# Patient Record
Sex: FEMALE | Race: WHITE | NOT HISPANIC OR LATINO | Employment: FULL TIME | URBAN - METROPOLITAN AREA
[De-identification: names, ages, dates, MRNs, and addresses within clinical notes are randomized per-mention and may not be internally consistent; named-entity substitution may affect disease eponyms.]

---

## 2021-12-13 ENCOUNTER — VIRTUAL VISIT (OUTPATIENT)
Dept: DERMATOLOGY | Facility: CLINIC | Age: 36
End: 2021-12-13
Payer: COMMERCIAL

## 2021-12-13 DIAGNOSIS — L63.9 ALOPECIA AREATA: Primary | ICD-10-CM

## 2021-12-13 PROCEDURE — 99203 OFFICE O/P NEW LOW 30 MIN: CPT | Mod: 95 | Performed by: DERMATOLOGY

## 2021-12-13 RX ORDER — BETAMETHASONE DIPROPIONATE 0.5 MG/G
OINTMENT, AUGMENTED TOPICAL 2 TIMES DAILY
Qty: 50 G | Refills: 3 | Status: SHIPPED | OUTPATIENT
Start: 2021-12-13

## 2021-12-13 ASSESSMENT — PAIN SCALES - GENERAL: PAINLEVEL: NO PAIN (0)

## 2021-12-13 NOTE — LETTER
Date:December 15, 2021      Patient was self referred, no letter generated. Do not send.        [facility] Health Information

## 2021-12-13 NOTE — NURSING NOTE
Chief Complaint   Patient presents with     Hair Loss     Howie, is here for a hairloss appt.    Yefri Tang EMT

## 2021-12-13 NOTE — LETTER
12/13/2021       RE: Howie Green  [address] [place] [address]     Dear Colleague,    Thank you for referring your patient, Howie Green, to the [facility] DERMATOLOGY CLINIC [place] at [facility]. Please see a copy of my visit note below.    [facility] Dermatology Note  Encounter Date: Dec 13, 2021  MyChart Connected.    Dermatology Problem List:  1. Alopecia areata: frontal hairline  - augmented betamethasone ointment; in-person intralesional triamcinolone injections pending    ____________________________________________    Assessment & Plan:     1. Alopecia areata: typical clinical appearance and history. Has personal history of Graves but currently off medication and well-controlled. We discussed what is known about the pathophysiology and natural history of alopecia areata, including its unpredictable nature. Will start with topicals and come in for intralesional injections. If refractory, consider uptitration to systemic therapies.  - augmented betamethasone ointment  - come in for intralesional triamcinolone injections    Procedures Performed:    None    Follow-up: 2-3 weeks in-person    Staff:     Fili Ramirez MD, FAAD   of Dermatology  Department of Dermatology  [facility] [employer]    ____________________________________________    CC: Hair Loss (Howie, is here for a hairloss appt. )    HPI:  Ms. Howie Green is a(n) 36 year old female who presents today as a new patient for hair loss    Hair loss - noticed thinning in June  - now bald spot on scalp  - no change in size for last few months  - some fuzz has regrowth - but gray  - no scalp symptoms, no other body hair loss, no increased stressors  - has Graves hyperthyroidism    Patient is otherwise feeling well, without additional skin concerns.    Labs Reviewed:  Care Everywhere 8/2021 TSH  unremarkable    Physical Exam:  Vitals: There were no vitals taken for this visit.  SKIN: video images were reviewed; image quality and interpretability: acceptable. Image date: n/a.  - sharply circumscribed patch of hair loss along the frontal hairline  - No other lesions of concern on areas examined.     Medications:  No current outpatient medications on file.     No current facility-administered medications for this visit.      Past Medical/Surgical History:   There is no problem list on file for this patient.    History reviewed. No pertinent past medical history.    CC Referred MD Andres  No address on file on close of this encounter.      Again, thank you for allowing me to participate in the care of your patient.      Sincerely,    Fili Ramirez MD

## 2021-12-13 NOTE — PROGRESS NOTES
[facility] Dermatology Note  Encounter Date: Dec 13, 2021  MyChart Connected.    Dermatology Problem List:  1. Alopecia areata: frontal hairline  - augmented betamethasone ointment; in-person intralesional triamcinolone injections pending    ____________________________________________    Assessment & Plan:     1. Alopecia areata: typical clinical appearance and history. Has personal history of Graves but currently off medication and well-controlled. We discussed what is known about the pathophysiology and natural history of alopecia areata, including its unpredictable nature. Will start with topicals and come in for intralesional injections. If refractory, consider uptitration to systemic therapies.  - augmented betamethasone ointment  - come in for intralesional triamcinolone injections    Procedures Performed:    None    Follow-up: 2-3 weeks in-person    Staff:     Fili Ramirez MD, FAAD   of Dermatology  Department of Dermatology  [facility] [employer] [facility]    ____________________________________________    CC: Hair Loss (Howie, is here for a hairloss appt. )    HPI:  Ms. Howie Green is a(n) 36 year old female who presents today as a new patient for hair loss    Hair loss - noticed thinning in June  - now bald spot on scalp  - no change in size for last few months  - some fuzz has regrowth - but gray  - no scalp symptoms, no other body hair loss, no increased stressors  - has Graves hyperthyroidism    Patient is otherwise feeling well, without additional skin concerns.    Labs Reviewed:  Care Everywhere 8/2021 TSH unremarkable    Physical Exam:  Vitals: There were no vitals taken for this visit.  SKIN: video images were reviewed; image quality and interpretability: acceptable. Image date: n/a.  - sharply circumscribed patch of hair loss along the frontal hairline  - No other lesions of concern on areas examined.     Medications:  No current  outpatient medications on file.     No current facility-administered medications for this visit.      Past Medical/Surgical History:   There is no problem list on file for this patient.    History reviewed. No pertinent past medical history.    CC Referred Self, MD  No address on file on close of this encounter.

## 2022-01-11 ENCOUNTER — OFFICE VISIT (OUTPATIENT)
Dept: DERMATOLOGY | Facility: CLINIC | Age: 37
End: 2022-01-11
Payer: COMMERCIAL

## 2022-01-11 DIAGNOSIS — L63.9 ALOPECIA AREATA: Primary | ICD-10-CM

## 2022-01-11 PROCEDURE — 11900 INJECT SKIN LESIONS </W 7: CPT | Performed by: DERMATOLOGY

## 2022-01-11 ASSESSMENT — PAIN SCALES - GENERAL: PAINLEVEL: NO PAIN (0)

## 2022-01-11 NOTE — PROGRESS NOTES
Drug Administration Record    Prior to injection, verified patient identity using patient's name and date of birth.  Due to injection administration, patient instructed to remain in clinic for 15 minutes  afterwards, and to report any adverse reaction to me immediately.    Drug Name: triamcinolone acetonide(kenalog)  Dose: 1.7 mL of triamcinolone 5mg/mL, 8.5mg dose  Route administered: ID  NDC #: Kenalog-10 (2502-0813-16)  Amount of waste(mL): 4.15  Reason for waste: Single use vial    LOT #: [id number]  SITE: See Note  : [employer]  EXPIRATION DATE: APR 2023

## 2022-01-11 NOTE — PROGRESS NOTES
[facility] Dermatology Note  Encounter Date: Jan 11, 2022  Office Visit     Dermatology Problem List:  1. Alopecia areata: frontal hairline  - prior tx: augmented betamethasone ointment (patient stopped due to irritation)  - s/p ILK 1/11/21  ____________________________________________    Assessment & Plan:    1. Alopecia areata: typical clinical appearance and history. Has personal history of Graves but currently off medication and well-controlled.  - Photodocumentation  - ILK injection today, see procedure note below  - Recommended that patient discontinue topicals as it was likely topicals would be unable to penetrate deep enough  - Recommended that patient take a daily OTC antihistamine such as allegra to help boost the effectiveness of ILK injections  - Recommended sun protection and 30+ SPF sunscreen to the affected spot    Procedures Performed:   - Intra-lesional triamcinolone procedure note. After positioning and cleansing with isopropyl alcohol, 1.7 total mL of triamcinolone 5 mg/mL was injected into 1 lesion(s) on the central frontal scalp. The patient tolerated the procedure well and left the dermatology clinic in good condition.    Follow-up: 1 month(s) in-person, or earlier for new or changing lesions    Staff, Resident and Scribe:     Tabitha Lomas DO   [facility] Resident   Pager#[phone]    Precepted with Dr. Radha Escalanteibtia Disclosure:  I, Fili Borrero, am serving as a scribe to document services personally performed by Basilio Garcia MD based on data collection and the provider's statements to me.     Provider Disclosure:   The documentation recorded by the scribe accurately reflects the services I personally performed and the decisions made by me.    Basilio Garcia MD    Department of Dermatology  [facility]: Phone: [phone], Fax:[phone]  [facility]  [facility]: Phone: [phone] Fax: [phone]    ____________________________________________    CC: Hair Loss (follow up on hair loss.  ILK injections)    HPI:  Ms. Howie Green is a(n) 36 year old female who presents today as a return patient for ILK injections. Patient was last seen by Dr. Ramirez on 12/13/21 for alopecia areata. Per her last visit, patient was started on augmented betamethasone ointment and recommended to come in for ILK injections.    Patient reports that she first began noticing her hair shedding and thinning in June. She notes that the onset was very sudden and that since then the affected spot on her scalp has not gotten any larger. Patient reports that she tried the augmented betamethasone ointment for a bit, however reports that her scalp became irritated and after consulting a pharmacist she stopped taking the ointment. Patient does report a slight increase in gray hairs in central frontal scalp which was not present prior to starting her treatment. Denies any eyebrow/eyelish changes. Denies any other spots of hair loss. No history of seasonal allergies.    Patient is otherwise feeling well, without additional skin concerns.    Labs Reviewed:  N/A    Physical Exam:  Vitals: There were no vitals taken for this visit.  SKIN: Focused examination of scalp was performed.  - Hair pull test is positive of periphery.  - Somewhat thin white eyebrows but normal eyelashes and body hair.  - No significant scalp erythema or scale.  - Central frontal scalp, 4.5 cm by 5.2 cm patchy area of alopecia with some central white fine hair regrowth.  - No other lesions of concern on areas examined.     Medications:  Current Outpatient Medications   Medication     augmented betamethasone dipropionate (DIPROLENE-AF) 0.05 % external ointment     No current facility-administered medications for this visit.      Past Medical History:   There is no problem list on file for this  patient.    No past medical history on file.

## 2022-01-11 NOTE — LETTER
Date:January 12, 2022      Patient was self referred, no letter generated. Do not send.        [facility] Information

## 2022-01-11 NOTE — LETTER
1/11/2022       RE: Howie Green  [address] [place] [address]     Dear Colleague,    Thank you for referring your patient, Howie Green, to the [facility] DERMATOLOGY CLINIC [place] at [facility]. Please see a copy of my visit note below.    [facility] Dermatology Note  Encounter Date: Jan 11, 2022  Office Visit     Dermatology Problem List:  1. Alopecia areata: frontal hairline  - prior tx: augmented betamethasone ointment (patient stopped due to irritation)  - s/p ILK 1/11/21  ____________________________________________    Assessment & Plan:    1. Alopecia areata: typical clinical appearance and history. Has personal history of Graves but currently off medication and well-controlled.  - Photodocumentation  - ILK injection today, see procedure note below  - Recommended that patient discontinue topicals as it was likely topicals would be unable to penetrate deep enough  - Recommended that patient take a daily OTC antihistamine such as allegra to help boost the effectiveness of ILK injections  - Recommended sun protection and 30+ SPF sunscreen to the affected spot    Procedures Performed:   - Intra-lesional triamcinolone procedure note. After positioning and cleansing with isopropyl alcohol, 1.7 total mL of triamcinolone 5 mg/mL was injected into 1 lesion(s) on the central frontal scalp. The patient tolerated the procedure well and left the dermatology clinic in good condition.    Follow-up: 1 month(s) in-person, or earlier for new or changing lesions    Staff, Resident and Scribe:     Tabitha Lomas DO   [facility] Resident   Pager#[phone]    Precepted with Dr. Garcia     Scribe Disclosure:  I, Fili Borrero, am serving as a scribe to document services personally performed by Basilio Garcia MD based on data collection and the provider's statements to me.     Provider Disclosure:   The documentation  recorded by the scribe accurately reflects the services I personally performed and the decisions made by me.    Basilio Garcia MD    Department of Dermatology  [facility]: Phone: [phone], Fax:[phone]  [facility]: Phone: [phone] Fax: [phone]    ____________________________________________    CC: Hair Loss (follow up on hair loss.  ILK injections)    HPI:  Ms. Howie Green is a(n) 36 year old female who presents today as a return patient for ILK injections. Patient was last seen by Dr. aRmirez on 12/13/21 for alopecia areata. Per her last visit, patient was started on augmented betamethasone ointment and recommended to come in for ILK injections.    Patient reports that she first began noticing her hair shedding and thinning in June. She notes that the onset was very sudden and that since then the affected spot on her scalp has not gotten any larger. Patient reports that she tried the augmented betamethasone ointment for a bit, however reports that her scalp became irritated and after consulting a pharmacist she stopped taking the ointment. Patient does report a slight increase in gray hairs in central frontal scalp which was not present prior to starting her treatment. Denies any eyebrow/eyelish changes. Denies any other spots of hair loss. No history of seasonal allergies.    Patient is otherwise feeling well, without additional skin concerns.    Labs Reviewed:  N/A    Physical Exam:  Vitals: There were no vitals taken for this visit.  SKIN: Focused examination of scalp was performed.  - Hair pull test is positive of periphery.  - Somewhat thin white eyebrows but normal eyelashes and body hair.  - No significant scalp erythema or scale.  - Central frontal scalp, 4.5 cm by 5.2 cm patchy area of alopecia with some central white fine hair regrowth.  - No other lesions of  concern on areas examined.     Medications:  Current Outpatient Medications   Medication     augmented betamethasone dipropionate (DIPROLENE-AF) 0.05 % external ointment     No current facility-administered medications for this visit.      Past Medical History:   There is no problem list on file for this patient.    No past medical history on file.       Drug Administration Record    Prior to injection, verified patient identity using patient's name and date of birth.  Due to injection administration, patient instructed to remain in clinic for 15 minutes  afterwards, and to report any adverse reaction to me immediately.    Drug Name: triamcinolone acetonide(kenalog)  Dose: 1.7 mL of triamcinolone 5mg/mL, 8.5mg dose  Route administered: ID  NDC #: Kenalog-10 (1353-2686-04)  Amount of waste(mL): 4.15  Reason for waste: Single use vial    LOT #: [id number]  SITE: See Note  : [employer]  EXPIRATION DATE: APR 2023      Again, thank you for allowing me to participate in the care of your patient.      Sincerely,    Basilio Garcia MD

## 2022-02-03 ENCOUNTER — OFFICE VISIT (OUTPATIENT)
Dept: DERMATOLOGY | Facility: CLINIC | Age: 37
End: 2022-02-03
Payer: COMMERCIAL

## 2022-02-03 DIAGNOSIS — L63.9 ALOPECIA AREATA: Primary | ICD-10-CM

## 2022-02-03 PROCEDURE — 11900 INJECT SKIN LESIONS </W 7: CPT | Performed by: DERMATOLOGY

## 2022-02-03 RX ORDER — FEXOFENADINE HCL 180 MG/1
180 TABLET ORAL DAILY
Qty: 30 TABLET | Refills: 11 | Status: SHIPPED | OUTPATIENT
Start: 2022-02-03 | End: 2022-03-31

## 2022-02-03 ASSESSMENT — PAIN SCALES - GENERAL: PAINLEVEL: NO PAIN (0)

## 2022-02-03 NOTE — NURSING NOTE
Drug Administration Record    Prior to injection, verified patient identity using patient's name and date of birth.  Due to injection administration, patient instructed to remain in clinic for 15 minutes  afterwards, and to report any adverse reaction to me immediately.    Drug Name: triamcinolone acetonide(kenalog)  Dose: .5mL of triamcinolone 5mg/mL, 1mg dose  Route administered: ID  NDC #: Kenalog-10 (6904-2095-63)  Amount of waste(mL):4 ml  Reason for waste: Single use vial or Multi dose vial    LOT #: [id number]  SITE: see providers notes    EXPIRATION DATE: apr,2023

## 2022-02-03 NOTE — LETTER
Date:February 4, 2022      Patient was self referred, no letter generated. Do not send.        [facility] Health Information

## 2022-02-03 NOTE — PATIENT INSTRUCTIONS
1. Alopecia areata  - Photodocumentation  - ILK injection today   - Recommended that patient take a daily OTC antihistamine such as allegra to help boost the effectiveness of ILK injections  - Recommend topical Rogaine foam if tolerated to scalp once a day  - Recommend sun protection and 30+ SPF sunscreen to the affected spot

## 2022-02-03 NOTE — LETTER
2/3/2022       RE: Howie Green  [address] [facility] [address]     Dear Colleague,    Thank you for referring your patient, Howie Green, to the [facility] DERMATOLOGY CLINIC [place] at [facility]. Please see a copy of my visit note below.    [facility] Dermatology Note  Encounter Date: Feb 3, 2022  Office Visit     Dermatology Problem List:  1. Alopecia areata: frontal hairline  - prior tx: augmented betamethasone ointment (patient stopped due to irritation)  - s/p ILK 1/11/22, 2/3/22  ____________________________________________    Assessment & Plan:    1. Alopecia areata: typical clinical appearance and history. Has personal history of Graves but currently off medication and well-controlled.  - Photodocumentation  - ILK injection today, see procedure note below  - Recommended that patient discontinue topicals as it was likely topicals would be unable to penetrate deep enough  - Recommended that patient take a daily OTC antihistamine such as allegra to help boost the effectiveness of ILK injections. Provided prescription of Allegra 180mg daily today.  - Recommended sun protection and 30+ SPF sunscreen to the affected spot    Procedures Performed:   - Intra-lesional triamcinolone procedure note. After positioning and cleansing with isopropyl alcohol, 1.2 total mL of triamcinolone 5 mg/mL was injected into affected area on the anterior scalp. The patient tolerated the procedure well and left the dermatology clinic in good condition.    Follow-up: 6 week(s) in-person, or earlier for new or changing lesions    Staff and Scribe:     Scribe Disclosure:  Svetlana KELLY, am serving as a scribe to document services personally performed by aBsilio Garcia MD based on data collection and the provider's statements to me.     Koki KELLY, examined and staffed the patient with Dr. Radha M.D.    Koki Interiano MS4    Provider  Disclosure:   The documentation recorded by the scribe accurately reflects the services I personally performed and the decisions made by me.    Basilio Garcia MD    Department of Dermatology  [facility]: Phone: [phone], Fax:[phone]  [facility]: Phone: [phone] Fax: [phone]  ____________________________________________    CC: Hair Loss (pt states she is here for follow up on hair loss and ILK, sx are a little better. )    HPI:  Ms. Howie Green is a(n) 36 year old female who presents today as a return patient for alopecia. Last seen in dermatology by myself on 1/11/22 at which point she received ILK injections for treatment of alopecia areata. She was also encouraged to take an OTC anti-histamine.     Here for a second round of ILK, since then she has been doing well. States new hairs are sprouting out over the past week.     Patient is otherwise feeling well, without additional skin concerns.    Labs Reviewed:  N/A    Physical Exam:  Vitals: There were no vitals taken for this visit.  SKIN: Focused examination of scalp, eyebrows was performed.  - negative hair pull test  - scalp without erythema or scale  - central frontal scalp with 4.5 cm X 5.2 cm patchy area of alopecia with some central white fine hair regrowth. Two small areas of skin depression without overlying deformity in this area.  - thin eyebrows but normal eyelashes.  - No other lesions of concern on areas examined.     Medications:  Current Outpatient Medications   Medication     augmented betamethasone dipropionate (DIPROLENE-AF) 0.05 % external ointment     Current Facility-Administered Medications   Medication     triamcinolone acetonide (KENALOG-10) injection 8 mg      Past Medical History:   There is no problem list on file for this patient.    No past medical history on file.         Again, thank you  for allowing me to participate in the care of your patient.      Sincerely,    Basilio Garcia MD

## 2022-02-03 NOTE — PROGRESS NOTES
[facility] Dermatology Note  Encounter Date: Feb 3, 2022  Office Visit     Dermatology Problem List:  1. Alopecia areata: frontal hairline  - prior tx: augmented betamethasone ointment (patient stopped due to irritation)  - s/p ILK 1/11/22, 2/3/22  ____________________________________________    Assessment & Plan:    1. Alopecia areata: typical clinical appearance and history. Has personal history of Graves but currently off medication and well-controlled.  - Photodocumentation  - ILK injection today, see procedure note below  - Recommended that patient discontinue topicals as it was likely topicals would be unable to penetrate deep enough  - Recommended that patient take a daily OTC antihistamine such as allegra to help boost the effectiveness of ILK injections. Provided prescription of Allegra 180mg daily today.  - Recommended sun protection and 30+ SPF sunscreen to the affected spot    Procedures Performed:   - Intra-lesional triamcinolone procedure note. After positioning and cleansing with isopropyl alcohol, 1.2 total mL of triamcinolone 5 mg/mL was injected into affected area on the anterior scalp. The patient tolerated the procedure well and left the dermatology clinic in good condition.    Follow-up: 6 week(s) in-person, or earlier for new or changing lesions    Staff and Scribe:     Scribe Disclosure:  Svetlana KELLY, am serving as a scribe to document services personally performed by Basilio Garcia MD based on data collection and the provider's statements to me.     Koki KELLY, examined and staffed the patient with TERRI Weston MS4    Provider Disclosure:   The documentation recorded by the scribe accurately reflects the services I personally performed and the decisions made by me.    Basilio Garcia MD    Department of Dermatology  [facility]: Phone: [phone],  Fax:[phone]  [facility]: Phone: [phone] Fax: [phone]  ____________________________________________    CC: Hair Loss (pt states she is here for follow up on hair loss and ILK, sx are a little better. )    HPI:  Ms. Howie Green is a(n) 36 year old female who presents today as a return patient for alopecia. Last seen in dermatology by myself on 1/11/22 at which point she received ILK injections for treatment of alopecia areata. She was also encouraged to take an OTC anti-histamine.     Here for a second round of ILK, since then she has been doing well. States new hairs are sprouting out over the past week.     Patient is otherwise feeling well, without additional skin concerns.    Labs Reviewed:  N/A    Physical Exam:  Vitals: There were no vitals taken for this visit.  SKIN: Focused examination of scalp, eyebrows was performed.  - negative hair pull test  - scalp without erythema or scale  - central frontal scalp with 4.5 cm X 5.2 cm patchy area of alopecia with some central white fine hair regrowth. Two small areas of skin depression without overlying deformity in this area.  - thin eyebrows but normal eyelashes.  - No other lesions of concern on areas examined.     Medications:  Current Outpatient Medications   Medication     augmented betamethasone dipropionate (DIPROLENE-AF) 0.05 % external ointment     Current Facility-Administered Medications   Medication     triamcinolone acetonide (KENALOG-10) injection 8 mg      Past Medical History:   There is no problem list on file for this patient.    No past medical history on file.

## 2022-02-06 ENCOUNTER — HEALTH MAINTENANCE LETTER (OUTPATIENT)
Age: 37
End: 2022-02-06

## 2022-03-30 NOTE — PROGRESS NOTES
[facility] Dermatology Note  Encounter Date: Mar 31, 2022  Office Visit     Dermatology Problem List:  1. Alopecia areata: frontal hairline  - prior tx: augmented betamethasone ointment (patient stopped due to irritation)  - s/p ILK 1/11/22, 2/3/22, 3/31/2022   ____________________________________________    Assessment & Plan:    # Alopecia areata: typical clinical appearance and history. Has personal history of Graves but currently off medication and well-controlled. Improving.  - Photodocumentation today  - ILK injection today, see procedure note below  - Recommended that patient take a daily OTC antihistamine such as allegra to help boost the effectiveness of ILK injections. Provided prescription of Allegra 180mg daily today.  - Recommended starting topical minoxidil 5% daily    Procedures Performed:   - Intra-lesional triamcinolone procedure note. After positioning and cleansing with isopropyl alcohol, 1.6 total mL of triamcinolone 5 mg/mL was injected into 16 site(s) on the scalp. The patient tolerated the procedure well and left the dermatology clinic in good condition.    Follow-up: 6-8 week(s) in-person, or earlier for new or changing lesions    Staff and Scribe:     Scribe Disclosure:  I, Demario Rodriguez, am serving as a scribe to document services personally performed by Basilio Garcia MD based on data collection and the provider's statements to me.     Provider Disclosure:   The documentation recorded by the scribe accurately reflects the services I personally performed and the decisions made by me.    Basilio Garcia MD    Department of Dermatology  [facility]: Phone: [phone], Fax:[phone]  [facility]: Phone: [phone] Fax: [phone]  ____________________________________________    CC: Hair Loss (pt states she is here for a follow up on  hair hair loss, ILK)    HPI:  Ms. Howie Green is a(n) 36 year old female who presents today as a return patient for follow-up. Last seen by me on 2/3/2022, at which time patient underwent ILK for treatment of alopecia areata.    Today, patient reports improvement in her alopecia areata since last ILK treatment. She did not start allegra.    Patient is otherwise feeling well, without additional skin concerns.    Labs Reviewed:  N/A    Physical Exam:  Vitals: There were no vitals taken for this visit.  SKIN: Focused examination of scalp was performed.  - Oval shaped area of alopecia on the central frontal scalp with robust central hair regrowth.  - Positive hair pull test on posterior periphery.  - No significant scalp erythema or scale.  - Normal eyebrows and eyelashes.  - No other lesions of concern on areas examined.     Medications:  Current Outpatient Medications   Medication     augmented betamethasone dipropionate (DIPROLENE-AF) 0.05 % external ointment     fexofenadine (ALLEGRA) 180 MG tablet     Current Facility-Administered Medications   Medication     triamcinolone acetonide (KENALOG-10) injection 6 mg     triamcinolone acetonide (KENALOG-10) injection 8 mg      Past Medical History:   There is no problem list on file for this patient.    No past medical history on file.

## 2022-03-31 ENCOUNTER — OFFICE VISIT (OUTPATIENT)
Dept: DERMATOLOGY | Facility: CLINIC | Age: 37
End: 2022-03-31
Payer: COMMERCIAL

## 2022-03-31 DIAGNOSIS — L63.9 ALOPECIA AREATA: ICD-10-CM

## 2022-03-31 PROCEDURE — 11901 INJECT SKIN LESIONS >7: CPT | Performed by: DERMATOLOGY

## 2022-03-31 RX ORDER — FEXOFENADINE HCL 180 MG/1
180 TABLET ORAL DAILY
Qty: 30 TABLET | Refills: 11 | Status: SHIPPED | OUTPATIENT
Start: 2022-03-31 | End: 2022-05-12

## 2022-03-31 ASSESSMENT — PAIN SCALES - GENERAL: PAINLEVEL: NO PAIN (0)

## 2022-03-31 NOTE — PATIENT INSTRUCTIONS
1. Start allegra 180 mg once daily.   2. Consider starting minoxidil 5% (rogaine) once daily at nighttime before bed. OK to buy the men's strength but just once daily instead of twice daily.

## 2022-03-31 NOTE — NURSING NOTE
Drug Administration Record    Prior to injection, verified patient identity using patient's name and date of birth.  Due to injection administration, patient instructed to remain in clinic for 15 minutes  afterwards, and to report any adverse reaction to me immediately.    Drug Name: triamcinolone acetonide(kenalog)  Dose: 2mL of triamcinolone 5mg/mL, 10mg dose  Route administered: Id  NDC #: Kenalog-10 (7630-7109-77)  Amount of waste(mL):4ml  Reason for waste: Single use vial    LOT #: [id number]   SITE: see provider notes   EXPIRATION DATE: may 2023

## 2022-03-31 NOTE — LETTER
3/31/2022       RE: Howie Green  [address] [facility] [address]     Dear Colleague,    Thank you for referring your patient, Howie Green, to the [facility] DERMATOLOGY CLINIC [place] at [facility]. Please see a copy of my visit note below.    [facility] Dermatology Note  Encounter Date: Mar 31, 2022  Office Visit     Dermatology Problem List:  1. Alopecia areata: frontal hairline  - prior tx: augmented betamethasone ointment (patient stopped due to irritation)  - s/p ILK 1/11/22, 2/3/22, 3/31/2022   ____________________________________________    Assessment & Plan:    # Alopecia areata: typical clinical appearance and history. Has personal history of Graves but currently off medication and well-controlled. Improving.  - Photodocumentation today  - ILK injection today, see procedure note below  - Recommended that patient take a daily OTC antihistamine such as allegra to help boost the effectiveness of ILK injections. Provided prescription of Allegra 180mg daily today.  - Recommended starting topical minoxidil 5% daily    Procedures Performed:   - Intra-lesional triamcinolone procedure note. After positioning and cleansing with isopropyl alcohol, 1.6 total mL of triamcinolone 5 mg/mL was injected into 16 site(s) on the scalp. The patient tolerated the procedure well and left the dermatology clinic in good condition.    Follow-up: 6-8 week(s) in-person, or earlier for new or changing lesions    Staff and Scribe:     Scribe Disclosure:  I, Demario Rodriguez, am serving as a scribe to document services personally performed by Basilio Garcia MD based on data collection and the provider's statements to me.     Provider Disclosure:   The documentation recorded by the scribe accurately reflects the services I personally performed and the decisions made by me.    Basilio Garcia MD    Department of  Dermatology  [facility]: Phone: [phone], Fax:[phone]  [facility]: Phone: [phone] Fax: [phone]  ____________________________________________    CC: Hair Loss (pt states she is here for a follow up on hair hair loss, ILK)    HPI:  Ms. Howie Green is a(n) 36 year old female who presents today as a return patient for follow-up. Last seen by me on 2/3/2022, at which time patient underwent ILK for treatment of alopecia areata.    Today, patient reports improvement in her alopecia areata since last ILK treatment. She did not start allegra.    Patient is otherwise feeling well, without additional skin concerns.    Labs Reviewed:  N/A    Physical Exam:  Vitals: There were no vitals taken for this visit.  SKIN: Focused examination of scalp was performed.  - Oval shaped area of alopecia on the central frontal scalp with robust central hair regrowth.  - Positive hair pull test on posterior periphery.  - No significant scalp erythema or scale.  - Normal eyebrows and eyelashes.  - No other lesions of concern on areas examined.     Medications:  Current Outpatient Medications   Medication     augmented betamethasone dipropionate (DIPROLENE-AF) 0.05 % external ointment     fexofenadine (ALLEGRA) 180 MG tablet     Current Facility-Administered Medications   Medication     triamcinolone acetonide (KENALOG-10) injection 6 mg     triamcinolone acetonide (KENALOG-10) injection 8 mg      Past Medical History:   There is no problem list on file for this patient.    No past medical history on file.         Again, thank you for allowing me to participate in the care of your patient.      Sincerely,    Basilio Garcia MD

## 2022-03-31 NOTE — LETTER
Date:April 1, 2022      Provider requested that no letter be sent. Do not send.       [facility]

## 2022-04-07 ENCOUNTER — TELEPHONE (OUTPATIENT)
Dept: DERMATOLOGY | Facility: CLINIC | Age: 37
End: 2022-04-07
Payer: COMMERCIAL

## 2022-05-12 ENCOUNTER — OFFICE VISIT (OUTPATIENT)
Dept: DERMATOLOGY | Facility: CLINIC | Age: 37
End: 2022-05-12
Payer: COMMERCIAL

## 2022-05-12 DIAGNOSIS — L63.9 ALOPECIA AREATA: ICD-10-CM

## 2022-05-12 PROCEDURE — 11901 INJECT SKIN LESIONS >7: CPT | Performed by: DERMATOLOGY

## 2022-05-12 RX ORDER — FEXOFENADINE HCL 180 MG/1
180 TABLET ORAL DAILY
Qty: 30 TABLET | Refills: 11 | Status: SHIPPED | OUTPATIENT
Start: 2022-05-12

## 2022-05-12 ASSESSMENT — PAIN SCALES - GENERAL: PAINLEVEL: NO PAIN (0)

## 2022-05-12 NOTE — NURSING NOTE
"Dermatology Rooming Note    Howie Green's goals for this visit include:   Chief Complaint   Patient presents with     Hair Loss     Howie is here today for follow up Alopecia areata - \"so far so good\"     Tammy Delgado, EMT    "

## 2022-05-12 NOTE — LETTER
Date:May 13, 2022      Provider requested that no letter be sent. Do not send.       [facility]

## 2022-05-12 NOTE — NURSING NOTE
Drug Administration Record    Prior to injection, verified patient identity using patient's name and date of birth.  Due to injection administration, patient instructed to remain in clinic for 15 minutes  afterwards, and to report any adverse reaction to me immediately.    Drug Name: triamcinolone acetonide(kenalog)  Dose: 2mL of triamcinolone 10mg/mL, 20mg dose  Route administered: ID  NDC #: Kenalog-10 (3484-3985-78)  Amount of waste(mL):3ml  Reason for waste: Multi dose vial    LOT #: [id number]  SITE: [employer]  : [employer]  EXPIRATION DATE: 09/2023

## 2022-05-12 NOTE — PROGRESS NOTES
[facility] Dermatology Note  Encounter Date: May 12, 2022  Office Visit     Dermatology Problem List:  1. Alopecia areata: frontal hairline  - prior tx: augmented betamethasone ointment (patient stopped due to irritation)  - s/p ILK   ____________________________________________    Assessment & Plan:    # Alopecia areata: typical clinical appearance and history. Has personal history of Graves but currently off medication and well-controlled. Chronic, not at goal, but improving.    - Photodocumentation today  - ILK injection today, see procedure note below. Will increase to 10 mg/cc strength.   - Recommended that patient take a daily OTC antihistamine such as allegra to help boost the effectiveness of ILK injections. Provided prescription of Allegra 180mg daily today (print-out today given issues with )  - Recommended starting topical minoxidil 5% daily    Procedures Performed:   - Intra-lesional triamcinolone procedure note. After positioning and cleansing with isopropyl alcohol, 1.3 total mL of triamcinolone 10 mg/mL was injected into 13 sites (s) on the frontal scalp. The patient tolerated the procedure well and left the dermatology clinic in good condition.    Follow-up: 6 week(s) in-person, or earlier for new or changing lesions    Staff and Scribe:     Scribe Disclosure:  I, Svetlana Seymour, am serving as a scribe to prep the notes for Basilio Garcia MD.     Provider Disclosure:   The documentation recorded by the scribe accurately reflects the services I personally performed and the decisions made by me.    Basilio Garcia MD    Department of Dermatology  [facility]: Phone: [phone], Fax:[phone]  [facility]: Phone: [phone] Fax: [phone]  ____________________________________________    CC: Hair Loss (Howie is here today for follow up Alopecia  "areata - \"so far so good\")    HPI:  Ms. Howie Green is a(n) 36 year old female who presents today as a return patient for alopecia areata. Last seen by myself on 3/31/22 at which point she received ILK for treatment of hair loss and was advised to start Rogaine 5% foam.     Since last visit, she states things are improved from prior, but not resolved. Still does have patchy areas with no hair growth. Since last visit, did try minoxidil 5% though states she developed headaches after using it for a few days and discontinued. Continues to have problems with electronic transfer of the allegra prescription to [employer].     Patient is otherwise feeling well, without additional skin concerns.    Labs Reviewed:  N/A    Physical Exam:  Vitals: There were no vitals taken for this visit.  SKIN: Focused examination of scalp was performed.  - Oval shaped area of alopecia with robust central hair regrowth on the central frontal scalp. Still with 2 patches of complete alopecia on periphery with positive hair pull test. No eyebrow or eyelash changes.   - No other lesions of concern on areas examined.     Medications:  Current Outpatient Medications   Medication     augmented betamethasone dipropionate (DIPROLENE-AF) 0.05 % external ointment     fexofenadine (ALLEGRA) 180 MG tablet     Current Facility-Administered Medications   Medication     triamcinolone acetonide (KENALOG-10) injection 6 mg     triamcinolone acetonide (KENALOG-10) injection 8 mg      Past Medical History:   There is no problem list on file for this patient.    No past medical history on file.       "

## 2022-05-12 NOTE — LETTER
5/12/2022       RE: Howie Green  [address] [place] [facility] [address]     Dear Colleague,    Thank you for referring your patient, Howie Green, to the [facility] DERMATOLOGY CLINIC [place] at [facility]. Please see a copy of my visit note below.    [facility] Dermatology Note  Encounter Date: May 12, 2022  Office Visit     Dermatology Problem List:  1. Alopecia areata: frontal hairline  - prior tx: augmented betamethasone ointment (patient stopped due to irritation)  - s/p ILK   ____________________________________________    Assessment & Plan:    # Alopecia areata: typical clinical appearance and history. Has personal history of Graves but currently off medication and well-controlled. Chronic, not at goal, but improving.    - Photodocumentation today  - ILK injection today, see procedure note below. Will increase to 10 mg/cc strength.   - Recommended that patient take a daily OTC antihistamine such as allegra to help boost the effectiveness of ILK injections. Provided prescription of Allegra 180mg daily today (print-out today given issues with )  - Recommended starting topical minoxidil 5% daily    Procedures Performed:   - Intra-lesional triamcinolone procedure note. After positioning and cleansing with isopropyl alcohol, 1.3 total mL of triamcinolone 10 mg/mL was injected into 13 sites (s) on the frontal scalp. The patient tolerated the procedure well and left the dermatology clinic in good condition.    Follow-up: 6 week(s) in-person, or earlier for new or changing lesions    Staff and Scribe:     Scribe Disclosure:  I, Svetlana Seymour, am serving as a scribe to prep the notes for Basilio Garcia MD.     Provider Disclosure:   The documentation recorded by the scribe accurately reflects the services I personally performed and the decisions made by me.    Basilio Garcia MD    Department of  "Dermatology  [facility]: Phone: [phone], Fax:[phone]  [facility]: Phone: [phone] Fax: [phone]  ____________________________________________    CC: Hair Loss (Howie is here today for follow up Alopecia areata - \"so far so good\")    HPI:  Ms. Howie Green is a(n) 36 year old female who presents today as a return patient for alopecia areata. Last seen by myself on 3/31/22 at which point she received ILK for treatment of hair loss and was advised to start Rogaine 5% foam.     Since last visit, she states things are improved from prior, but not resolved. Still does have patchy areas with no hair growth. Since last visit, did try minoxidil 5% though states she developed headaches after using it for a few days and discontinued. Continues to have problems with electronic transfer of the allegra prescription to [employer].     Patient is otherwise feeling well, without additional skin concerns.    Labs Reviewed:  N/A    Physical Exam:  Vitals: There were no vitals taken for this visit.  SKIN: Focused examination of scalp was performed.  - Oval shaped area of alopecia with robust central hair regrowth on the central frontal scalp. Still with 2 patches of complete alopecia on periphery with positive hair pull test. No eyebrow or eyelash changes.   - No other lesions of concern on areas examined.     Medications:  Current Outpatient Medications   Medication     augmented betamethasone dipropionate (DIPROLENE-AF) 0.05 % external ointment     fexofenadine (ALLEGRA) 180 MG tablet     Current Facility-Administered Medications   Medication     triamcinolone acetonide (KENALOG-10) injection 6 mg     triamcinolone acetonide (KENALOG-10) injection 8 mg      Past Medical History:   There is no problem list on file for this patient.    No past medical history on file.           Again, thank you for allowing me " to participate in the care of your patient.      Sincerely,    Basilio Garcia MD

## 2022-06-23 ENCOUNTER — OFFICE VISIT (OUTPATIENT)
Dept: DERMATOLOGY | Facility: CLINIC | Age: 37
End: 2022-06-23
Payer: COMMERCIAL

## 2022-06-23 DIAGNOSIS — L63.9 ALOPECIA AREATA: Primary | ICD-10-CM

## 2022-06-23 PROCEDURE — 11901 INJECT SKIN LESIONS >7: CPT | Performed by: DERMATOLOGY

## 2022-06-23 ASSESSMENT — PAIN SCALES - GENERAL: PAINLEVEL: NO PAIN (0)

## 2022-06-23 NOTE — LETTER
Date:June 26, 2022      Provider requested that no letter be sent. Do not send.       [facility]

## 2022-06-23 NOTE — NURSING NOTE
Drug Administration Record    Prior to injection, verified patient identity using patient's name and date of birth.  Due to injection administration, patient instructed to remain in clinic for 15 minutes  afterwards, and to report any adverse reaction to me immediately.    Drug Name: triamcinolone acetonide(kenalog)  Dose: 2mL of triamcinolone 10mg/mL, 20mg dose  Route administered: ID  NDC #: Kenalog-10 (8611-5108-65)  Amount of waste(mL):3mL  Reason for waste: Multi dose vial    LOT #: [id number]  SITE: [facility]  : [place]  EXPIRATION DATE: 10/2023

## 2022-06-23 NOTE — NURSING NOTE
Dermatology Rooming Note    Howie Green's goals for this visit include:   Chief Complaint   Patient presents with     Hair Loss     Codie Fragoso, Visit Facilitator

## 2022-06-23 NOTE — PROGRESS NOTES
[facility] Dermatology Note  Encounter Date: Jun 23, 2022  Office Visit     Dermatology Problem List:  1. Alopecia areata: frontal hairline  - prior tx: augmented betamethasone ointment (patient stopped due to irritation)  - current tx: ILK, fexofenadine    ____________________________________________    Assessment & Plan:    # Alopecia areata: typical clinical appearance and history. Has personal history of Graves but currently off medication and well-controlled. Chronic, not at goal.   - Photodocumentation today  - ILK injection today, see procedure note below. Will plan to trial spacing out next injection to 3 months.  - Continue fexofenadine 180 mg daily  - Recommended starting topical minoxidil 5% daily    Procedures Performed:   - Intra-lesional triamcinolone procedure note. After verbal consent and review of risk of pain and skin thinning, positioning and cleansing with isopropyl alcohol, 1.4 total mL of triamcinolone 10 mg/mL was injected into 14 lesion(s) on the scalp. The patient tolerated the procedure well and left the dermatology clinic in good condition.    Follow-up: 3 month(s) in-person, or earlier for new or changing lesions    Staff and Scribe:     Scribe Disclosure:  I, Demario Rodriguez, am serving as a scribe to document services personally performed by Basilio Garcia MD based on data collection and the provider's statements to me.     Provider Disclosure:   The documentation recorded by the scribe accurately reflects the services I personally performed and the decisions made by me.    Basilio Garcia MD    Department of Dermatology  [facility]: Phone: [phone], Fax:[phone]  [facility]: Phone: [phone] Fax: [phone]  ____________________________________________    CC: Hair Loss    HPI:  Ms. Howie Green is a(n) 36 year old  female who presents today as a return patient for hair loss follow-up. Last seen by me on 5/12/2022, at which time patient underwent ILK injection for treatment of alopecia areata.    Today, patient denies scalp symptoms. Notes some bumpiness on scalp.    Patient is otherwise feeling well, without additional skin concerns.    Labs Reviewed:  N/A    Physical Exam:  Vitals: There were no vitals taken for this visit.  SKIN: Focused examination of scalp was performed.  - Oval shaped area of decreased hair density on the frontal scalp with robust central hair regrowth.  - Positive hair pull test on the right frontal scalp, negative on left.  - No significant scalp erythema or scale.  - No other lesions of concern on areas examined.     Medications:  Current Outpatient Medications   Medication     fexofenadine (ALLEGRA) 180 MG tablet     augmented betamethasone dipropionate (DIPROLENE-AF) 0.05 % external ointment     Current Facility-Administered Medications   Medication     triamcinolone acetonide (KENALOG-10) injection 13 mg     triamcinolone acetonide (KENALOG-10) injection 6 mg     triamcinolone acetonide (KENALOG-10) injection 8 mg      Past Medical History:   There is no problem list on file for this patient.    No past medical history on file.

## 2022-06-23 NOTE — LETTER
6/23/2022       RE: Howie Green  [address]  [place] [address]     Dear Colleague,    Thank you for referring your patient, Howie Green, to the [facility] DERMATOLOGY CLINIC [place] at [facility]. Please see a copy of my visit note below.    [facility] Dermatology Note  Encounter Date: Jun 23, 2022  Office Visit     Dermatology Problem List:  1. Alopecia areata: frontal hairline  - prior tx: augmented betamethasone ointment (patient stopped due to irritation)  - current tx: ILK, fexofenadine    ____________________________________________    Assessment & Plan:    # Alopecia areata: typical clinical appearance and history. Has personal history of Graves but currently off medication and well-controlled. Chronic, not at goal.   - Photodocumentation today  - ILK injection today, see procedure note below. Will plan to trial spacing out next injection to 3 months.  - Continue fexofenadine 180 mg daily  - Recommended starting topical minoxidil 5% daily    Procedures Performed:   - Intra-lesional triamcinolone procedure note. After verbal consent and review of risk of pain and skin thinning, positioning and cleansing with isopropyl alcohol, 1.4 total mL of triamcinolone 10 mg/mL was injected into 14 lesion(s) on the scalp. The patient tolerated the procedure well and left the dermatology clinic in good condition.    Follow-up: 3 month(s) in-person, or earlier for new or changing lesions    Staff and Scribe:     Scribe Disclosure:  I, Demario Rodriguez, am serving as a scribe to document services personally performed by Basilio Garcia MD based on data collection and the provider's statements to me.     Provider Disclosure:   The documentation recorded by the scribe accurately reflects the services I personally performed and the decisions made by me.    Basilio Garcia MD    Department of  Dermatology  [facility]: Phone: [phone], Fax:[phone]  [facility]: Phone: [phone] Fax: [phone]  ____________________________________________    CC: Hair Loss    HPI:  Ms. Howie Green is a(n) 36 year old female who presents today as a return patient for hair loss follow-up. Last seen by me on 5/12/2022, at which time patient underwent ILK injection for treatment of alopecia areata.    Today, patient denies scalp symptoms. Notes some bumpiness on scalp.    Patient is otherwise feeling well, without additional skin concerns.    Labs Reviewed:  N/A    Physical Exam:  Vitals: There were no vitals taken for this visit.  SKIN: Focused examination of scalp was performed.  - Oval shaped area of decreased hair density on the frontal scalp with robust central hair regrowth.  - Positive hair pull test on the right frontal scalp, negative on left.  - No significant scalp erythema or scale.  - No other lesions of concern on areas examined.     Medications:  Current Outpatient Medications   Medication     fexofenadine (ALLEGRA) 180 MG tablet     augmented betamethasone dipropionate (DIPROLENE-AF) 0.05 % external ointment     Current Facility-Administered Medications   Medication     triamcinolone acetonide (KENALOG-10) injection 13 mg     triamcinolone acetonide (KENALOG-10) injection 6 mg     triamcinolone acetonide (KENALOG-10) injection 8 mg      Past Medical History:   There is no problem list on file for this patient.    No past medical history on file.        Again, thank you for allowing me to participate in the care of your patient.      Sincerely,    Basilio Garcia MD

## 2022-10-03 ENCOUNTER — HEALTH MAINTENANCE LETTER (OUTPATIENT)
Age: 37
End: 2022-10-03

## 2023-02-11 ENCOUNTER — HEALTH MAINTENANCE LETTER (OUTPATIENT)
Age: 38
End: 2023-02-11

## 2024-03-09 ENCOUNTER — HEALTH MAINTENANCE LETTER (OUTPATIENT)
Age: 39
End: 2024-03-09

## 2025-03-16 ENCOUNTER — HEALTH MAINTENANCE LETTER (OUTPATIENT)
Age: 40
End: 2025-03-16